# Patient Record
Sex: MALE | Race: WHITE
[De-identification: names, ages, dates, MRNs, and addresses within clinical notes are randomized per-mention and may not be internally consistent; named-entity substitution may affect disease eponyms.]

---

## 2018-12-29 ENCOUNTER — HOSPITAL ENCOUNTER (EMERGENCY)
Dept: HOSPITAL 25 - ED | Age: 43
Discharge: HOME | End: 2018-12-29
Payer: COMMERCIAL

## 2018-12-29 VITALS — SYSTOLIC BLOOD PRESSURE: 121 MMHG | DIASTOLIC BLOOD PRESSURE: 82 MMHG

## 2018-12-29 DIAGNOSIS — M54.9: Primary | ICD-10-CM

## 2018-12-29 PROCEDURE — 99282 EMERGENCY DEPT VISIT SF MDM: CPT

## 2018-12-29 PROCEDURE — 96372 THER/PROPH/DIAG INJ SC/IM: CPT

## 2018-12-29 NOTE — ED
Back Pain





- HPI Summary


HPI Summary: 


43-year-old male presents with back pain for the past couple weeks.  He states 

he's been working more at work due the holidays and he has not had a break.  He 

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  No injury.  He 

states the pain feels like spasms.  it is affecting the right side of his back.

  He states he feels like he has a knot in his back.  He has tried meloxicam 

has for arthritis without relief.  He denies any urinary symptoms.  No fevers.  

no loss of bowel or bladder.  No saddle anesthesia.  No weakness, pain ,or 

numbness into his legs.  is able to ambulate with normal gait.  States his pain 

is worse at night when he developed spasms.  Medical history has a history of 

gout and arthritis.








- History of Current Complaint


Chief Complaint: EDBackInjuryPain


Stated Complaint: BACK PAIN


Time Seen by Provider: 12/29/18 17:31


Pain Intensity: 5





- Allergies/Home Medications


Allergies/Adverse Reactions: 


 Allergies











Allergy/AdvReac Type Severity Reaction Status Date / Time


 


No Known Allergies Allergy   Verified 12/29/18 17:01











Home Medications: 


 Home Medications





Meloxicam [Mobic] 15 mg PO DAILY 12/29/18 [History Confirmed 12/29/18]











PMH/Surg Hx/FS Hx/Imm Hx


Endocrine/Hematology History: 


   Denies: Hx Diabetes, Hx Thyroid Disease


Cardiovascular History: 


   Denies: Hx Hypertension


Respiratory History: 


   Denies: Hx Asthma, Hx Chronic Obstructive Pulmonary Disease (COPD)


GI History: 


   Denies: Hx Ulcer


Infectious Disease History: No


Infectious Disease History: 


   Denies: Hx Clostridium Difficile, Hx Hepatitis, Hx Human Immunodeficiency 

Virus (HIV), Hx of Known/Suspected MRSA, Hx Shingles, Hx Tuberculosis, Hx Known/

Suspected VRE, Hx Known/Suspected VRSA, History Other Infectious Disease, 

Traveled Outside the [place] in Last 30 Days





- Family History


Known Family History: Positive: Non-Contributory





- Social History


Alcohol Use: Weekly


Substance Use Type: Reports: None


Smoking Status (MU): Never Smoked Tobacco





Review of Systems


Negative: Fever


Negative: Chest Pain


Negative: Shortness Of Breath


Positive: Myalgia - back pain


All Other Systems Reviewed And Are Negative: Yes





Physical Exam


Triage Information Reviewed: Yes


Vital Signs On Initial Exam: 


 Initial Vitals











Temp Pulse Resp BP Pulse Ox


 


 98 F   64   16   126/82   100 


 


 12/29/18 17:02  12/29/18 17:02  12/29/18 17:02  12/29/18 17:02  12/29/18 17:02











Vital Signs Reviewed: Yes


Appearance: Positive: Well-Appearing


Skin: Positive: Warm, Dry


Head/Face: Positive: Normal Head/Face Inspection


Eyes: Positive: Normal, Conjunctiva Clear


ENT: Positive: Pharynx normal


Respiratory/Lung Sounds: Positive: Clear to Auscultation, Breath Sounds Present


Cardiovascular: Positive: Normal, RRR


Musculoskeletal: Positive: Strength/ROM Intact - back, Other - tenderness right 

side of back, no midline tenderness, neg SLR, sensation grossly intact, good 

pulses


Neurological: Positive: Reflexes Intact - patella, Normal Gait


Psychiatric: Positive: Normal





Diagnostics





- Vital Signs


 Vital Signs











  Temp Pulse Resp BP Pulse Ox


 


 12/29/18 17:02  98 F  64  16  126/82  100














- Laboratory


Lab Statement: Any lab studies that have been ordered have been reviewed, and 

results considered in the medical decision making process.





Back Pain Course/Dx





- Course


Course Of Treatment: 43-year-old male presents with back pain for the past 

couple weeks.  He states he's been working more at work due the holidays and he 

has not had a break.  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 

manager.  No injury.  He states the pain feels like spasms.  it is affecting 

the right side of his back.  He states he feels like he has a knot in his back.

  He has tried meloxicam has for arthritis without relief.  He denies any 

urinary symptoms.  No fevers.  no loss of bowel or bladder.  No saddle 

anesthesia.  No weakness, pain ,or numbness into his legs.  is able to ambulate 

with normal gait.  States his pain is worse at night when he developed spasms. 

on exam has no midline tenderness back, tenderness right side of back. normal 

gait. neurovascular intact. with no midline tenderness and pain described as 

spasms did not get any imaging. will prescribe flexeril and lidocaine patch. 

told to est care with primary. patient understand and agrees with plan.





- Diagnoses


Differential Diagnosis/HQI/PQRI: Positive: Herniated Disc, Strain, Sprain


Provider Diagnoses: 


 Back pain








Discharge





- Sign-Out/Discharge


Documenting (check all that apply): Patient Departure





- Discharge Plan


Condition: Good


Disposition: HOME


Prescriptions: 


Cyclobenzaprine TAB* [Flexeril 10 MG TAB*] 10 mg PO TID PRN #15 tab


 PRN Reason: Pain


Lidocaine PATCH 5%* [Lidoderm 5% Patch*] 1 patch TRANSDERM DAILY #7 patch


Patient Education Materials:  Back Pain (ED)


Referrals: 


[facility] [place] PHYSICIAN REFERRAL [Outside]


Additional Instructions: 


establish care with primary to follow up


Take muscle relaxers three times a day 


Apply lidocaine patches to area for up to 12 hours in one 24 hour period


Use ibuprofen or Tylenol for pain every 6 hours


ice/heat area, move as much as possible 


Return to ED if develop any new or worsening symptoms





- Billing Disposition and Condition


Condition: GOOD


Disposition: Home